# Patient Record
Sex: FEMALE | NOT HISPANIC OR LATINO | Employment: UNEMPLOYED | URBAN - METROPOLITAN AREA
[De-identification: names, ages, dates, MRNs, and addresses within clinical notes are randomized per-mention and may not be internally consistent; named-entity substitution may affect disease eponyms.]

---

## 2023-10-13 ENCOUNTER — TELEPHONE (OUTPATIENT)
Dept: PRIMARY CARE | Facility: CLINIC | Age: 63
End: 2023-10-13
Payer: COMMERCIAL

## 2023-10-13 DIAGNOSIS — Z78.0 POSTMENOPAUSAL STATUS (AGE-RELATED) (NATURAL): Primary | ICD-10-CM

## 2023-10-13 DIAGNOSIS — I10 HYPERTENSION, UNSPECIFIED TYPE: ICD-10-CM

## 2023-10-16 RX ORDER — CHLORTHALIDONE 25 MG/1
25 TABLET ORAL DAILY
COMMUNITY
Start: 2023-05-21 | End: 2023-10-16 | Stop reason: SDUPTHER

## 2023-10-16 RX ORDER — CHLORTHALIDONE 25 MG/1
12.5 TABLET ORAL DAILY
Qty: 45 TABLET | Refills: 0 | Status: SHIPPED | OUTPATIENT
Start: 2023-10-16 | End: 2023-10-17 | Stop reason: SDUPTHER

## 2023-10-16 NOTE — TELEPHONE ENCOUNTER
Pt says her insurance is making her pay for bone dexa test. They say the diagnostic code needs to be changed so this doesn't happen  Call back - [phone]  
RX Refill  Chorthalidone 25 mg   [facility]   
Never smoker

## 2023-10-17 ENCOUNTER — TELEPHONE (OUTPATIENT)
Dept: PRIMARY CARE | Facility: CLINIC | Age: 63
End: 2023-10-17
Payer: COMMERCIAL

## 2023-10-17 DIAGNOSIS — I10 HYPERTENSION, UNSPECIFIED TYPE: ICD-10-CM

## 2023-10-17 RX ORDER — CHLORTHALIDONE 25 MG/1
12.5 TABLET ORAL DAILY
Qty: 45 TABLET | Refills: 0 | Status: SHIPPED | OUTPATIENT
Start: 2023-10-17 | End: 2024-02-19 | Stop reason: SDUPTHER

## 2023-10-17 NOTE — TELEPHONE ENCOUNTER
Rx Refill Request Telephone Encounter    Name:  Cathryn Martinez  :  [id number]  Medication Name:   Chlorthalidone 25 mg            Specific Pharmacy location:  [facility]   Date of last appointment:    Date of next appointment:    Best number to reach patient:

## 2023-10-25 ENCOUNTER — TELEPHONE (OUTPATIENT)
Dept: PRIMARY CARE | Facility: CLINIC | Age: 63
End: 2023-10-25
Payer: COMMERCIAL

## 2023-10-25 NOTE — TELEPHONE ENCOUNTER
Pt is saying bone density test had wrong code. She is wanting an update if this is fixed so she doesn't have to pay 300$  Contact number- [phone]

## 2023-10-26 NOTE — TELEPHONE ENCOUNTER
Talked to pt and let her know the correct code was used in July and this month when it was reordered. Told her to call insurance company and find out specifically why they wont cover it.

## 2024-02-19 ENCOUNTER — TELEPHONE (OUTPATIENT)
Dept: PRIMARY CARE | Facility: CLINIC | Age: 64
End: 2024-02-19

## 2024-02-19 DIAGNOSIS — I10 HYPERTENSION, UNSPECIFIED TYPE: ICD-10-CM

## 2024-02-19 DIAGNOSIS — Z00.00 WELL ADULT EXAM: ICD-10-CM

## 2024-02-19 PROBLEM — N28.9 RENAL INSUFFICIENCY: Status: ACTIVE | Noted: 2020-11-16

## 2024-02-19 PROBLEM — E78.5 HYPERLIPIDEMIA: Status: ACTIVE | Noted: 2020-11-16

## 2024-02-19 PROBLEM — C44.91 BASAL CELL CARCINOMA (BCC): Status: ACTIVE | Noted: 2019-05-06

## 2024-02-19 PROBLEM — E78.2 MIXED HYPERLIPIDEMIA: Status: ACTIVE | Noted: 2019-04-21

## 2024-02-19 NOTE — TELEPHONE ENCOUNTER
Rx Refill Request Telephone Encounter    Name:  Cathryn Martinez  :  [id number]  Medication Name:  Chlorithaidone 25 mg            Specific Pharmacy location:  [facility]   Date of last appointment:    Date of next appointment:    Best number to reach patient:

## 2024-02-20 RX ORDER — CHLORTHALIDONE 25 MG/1
12.5 TABLET ORAL DAILY
Qty: 45 TABLET | Refills: 0 | Status: SHIPPED | OUTPATIENT
Start: 2024-02-20 | End: 2024-06-07 | Stop reason: SDUPTHER

## 2024-05-31 ENCOUNTER — LAB (OUTPATIENT)
Dept: LAB | Facility: LAB | Age: 64
End: 2024-05-31
Payer: COMMERCIAL

## 2024-05-31 DIAGNOSIS — Z00.00 WELL ADULT EXAM: ICD-10-CM

## 2024-05-31 LAB
ALBUMIN SERPL-MCNC: 4.1 G/DL (ref 3.5–5)
ALP BLD-CCNC: 86 U/L (ref 35–125)
ALT SERPL-CCNC: 12 U/L (ref 5–40)
ANION GAP SERPL CALC-SCNC: 10 MMOL/L
APPEARANCE UR: CLEAR
AST SERPL-CCNC: 18 U/L (ref 5–40)
BASOPHILS # BLD AUTO: 0.13 X10*3/UL (ref 0–0.1)
BASOPHILS NFR BLD AUTO: 2 %
BILIRUB SERPL-MCNC: 0.7 MG/DL (ref 0.1–1.2)
BILIRUB UR STRIP.AUTO-MCNC: NEGATIVE MG/DL
BUN SERPL-MCNC: 18 MG/DL (ref 8–25)
CALCIUM SERPL-MCNC: 9.5 MG/DL (ref 8.5–10.4)
CHLORIDE SERPL-SCNC: 105 MMOL/L (ref 97–107)
CHOLEST SERPL-MCNC: 245 MG/DL (ref 133–200)
CHOLEST/HDLC SERPL: 3.1 {RATIO}
CO2 SERPL-SCNC: 28 MMOL/L (ref 24–31)
COLOR UR: NORMAL
CREAT SERPL-MCNC: 1.1 MG/DL (ref 0.4–1.6)
EGFRCR SERPLBLD CKD-EPI 2021: 56 ML/MIN/1.73M*2
EOSINOPHIL # BLD AUTO: 0.13 X10*3/UL (ref 0–0.7)
EOSINOPHIL NFR BLD AUTO: 2 %
ERYTHROCYTE [DISTWIDTH] IN BLOOD BY AUTOMATED COUNT: 12.8 % (ref 11.5–14.5)
GLUCOSE SERPL-MCNC: 85 MG/DL (ref 65–99)
GLUCOSE UR STRIP.AUTO-MCNC: NORMAL MG/DL
HCT VFR BLD AUTO: 44.7 % (ref 36–46)
HDLC SERPL-MCNC: 79 MG/DL
HGB BLD-MCNC: 14.5 G/DL (ref 12–16)
IMM GRANULOCYTES # BLD AUTO: 0.02 X10*3/UL (ref 0–0.7)
IMM GRANULOCYTES NFR BLD AUTO: 0.3 % (ref 0–0.9)
KETONES UR STRIP.AUTO-MCNC: NEGATIVE MG/DL
LDLC SERPL CALC-MCNC: 138 MG/DL (ref 65–130)
LEUKOCYTE ESTERASE UR QL STRIP.AUTO: NEGATIVE
LYMPHOCYTES # BLD AUTO: 2.99 X10*3/UL (ref 1.2–4.8)
LYMPHOCYTES NFR BLD AUTO: 46.4 %
MCH RBC QN AUTO: 28.7 PG (ref 26–34)
MCHC RBC AUTO-ENTMCNC: 32.4 G/DL (ref 32–36)
MCV RBC AUTO: 89 FL (ref 80–100)
MONOCYTES # BLD AUTO: 0.59 X10*3/UL (ref 0.1–1)
MONOCYTES NFR BLD AUTO: 9.2 %
NEUTROPHILS # BLD AUTO: 2.58 X10*3/UL (ref 1.2–7.7)
NEUTROPHILS NFR BLD AUTO: 40.1 %
NITRITE UR QL STRIP.AUTO: NEGATIVE
NRBC BLD-RTO: 0 /100 WBCS (ref 0–0)
PH UR STRIP.AUTO: 5.5 [PH]
PLATELET # BLD AUTO: 276 X10*3/UL (ref 150–450)
POTASSIUM SERPL-SCNC: 4 MMOL/L (ref 3.4–5.1)
PROT SERPL-MCNC: 6.3 G/DL (ref 5.9–7.9)
PROT UR STRIP.AUTO-MCNC: NEGATIVE MG/DL
RBC # BLD AUTO: 5.05 X10*6/UL (ref 4–5.2)
RBC # UR STRIP.AUTO: NEGATIVE /UL
SODIUM SERPL-SCNC: 143 MMOL/L (ref 133–145)
SP GR UR STRIP.AUTO: 1.01
TRIGL SERPL-MCNC: 142 MG/DL (ref 40–150)
UROBILINOGEN UR STRIP.AUTO-MCNC: NORMAL MG/DL
WBC # BLD AUTO: 6.4 X10*3/UL (ref 4.4–11.3)

## 2024-05-31 PROCEDURE — 81003 URINALYSIS AUTO W/O SCOPE: CPT

## 2024-05-31 PROCEDURE — 80061 LIPID PANEL: CPT

## 2024-05-31 PROCEDURE — 80053 COMPREHEN METABOLIC PANEL: CPT

## 2024-05-31 PROCEDURE — 36415 COLL VENOUS BLD VENIPUNCTURE: CPT

## 2024-05-31 PROCEDURE — 85025 COMPLETE CBC W/AUTO DIFF WBC: CPT

## 2024-06-04 NOTE — PROGRESS NOTES
Subjective   Patient ID: Cathryn Martinez is a 64 y.o. female who presents for Annual Exam.    HPI   # 1. Aniya is here for annual physical exam. Her past medical history, family history and social history are reviewed and updated. She lives with her .   She is not yet on Medicare. Adri Simmons is her sister.  She feels well.   Their mother passed away in March at age 90.  She had a normal colonoscopy in the fall of 2020. Five year follow-up was recommended due to a positive family history.   She had a normal DEXA scan in 2023.  She has had her shingles vaccine.   Pre draw is reviewed.   Consultants: [facility] for routine skin checks. She has a history of basal cell skin cancer.  GYN: Her last Pap smear was in 2019. Her Pap smear was normal and HPV was negative. Her last period was in 2007 and she has had no abnormal bleeding bleeding since that time.    # 2. Aniya is here for follow-up of hypertension. Currently on chlorthalidone 12.5 milligrams. She cuts the 25 milligram tablet in half.   # 3.   She has elevated cholesterol.  She is on no medications.  Her cholesterol is elevated from last year.  She does have a high HDL.   She exercises regularly.    Review of Systems  Constitutional Symptoms: negative for fever, loss of appetite, headaches, fatigue or dizziness. Denies recent illness  Eyes: negative for loss and blurring of vision, no double vision.   Ear, Nose, Mouth, Throat: negative for hearing loss, tinnitus  Cardiovascular: negative for chest pain, palpitations, edema, claudication.   Respiratory: negative for shortness of breath, dyspnea on exertion, pain with breathing, coughing.   Breast: negative for tenderness, or masses   Gastrointestinal: negative for indigestion, abdominal pain, change in bowel habits, blood in stool.   Musculoskeletal: negative for joint pain, joint swelling, myalgias, cramps.   She is getting some stiffness of her knees when she is down on the  "floor.  Integumentary: negative for change in mole, skin trouble or rash.   Neurological: negative for numbness, tingling, weakness, tremors.   Psychiatric: negative for depression, anxiety.   Allergic: negative for seasonal symptoms, eczema    Objective   /66   Pulse (!) 49   Ht 1.695 m (5' 6.75\")   Wt 68.5 kg (151 lb)   SpO2 98%   BMI 23.83 kg/m²     Physical Exam  General Appearance: Comfortable. She is well nourished, and well developed.  Eyes: PERRLA, EOMI, conjunctiva and sclerae clear. Extraocular muscle exam reveals EOMI.  Ears, Nose, Mouth, Throat: Bilateral canals are normal. Both tympanic membranes are pearly gray and landmarks normal .   Nose is not congested. Throat is noninjected without exudate.  Neck: Neck reveals supple, no adenopathy, no thyromegaly, or carotid bruits.  Breasts: no skin dimpling. No tenderness or masses. No axillary adenopathy.  Chest: Lungs are clear to auscultation bilaterally with no wheezes, rales, or rhonchi.  Cardiovascular: RRR without MRG.  Abdomen: Abdomen is soft, NT, ND with no masses.  Lymph Nodes: Bilateral axillary lymph nodes are unremarkable. Bilateral inguinal lymph nodes are unremarkable.  Musculoskeletal: 5/5 and equal strength in bilateral upper and lower extremities.  Skin: Skin reveals good turgor and no rashes .  Neurological: Neuro: Intact and non-focal. Cranial nerves II - XII are grossly intact.  Psychiatric: Patient's mood is appropriate.    Assessment/Plan   Diagnoses and all orders for this visit:  Well adult exam  Primary hypertension  -     ECG 12 lead (Clinic Performed)  -     chlorthalidone (Hygroton) 25 mg tablet; Take 0.5 tablets (12.5 mg) by mouth once daily.  Pure hypercholesterolemia  Visit for screening mammogram  -     BI mammo bilateral screening tomosynthesis; Future  Need for vaccination  -     Pneumococcal conjugate vaccine, 20-valent (PREVNAR 20)  Other orders  -     Follow Up In Primary Care - Established; Future  -     " Follow Up In Primary Care - Health Maintenance; Future

## 2024-06-07 ENCOUNTER — OFFICE VISIT (OUTPATIENT)
Dept: PRIMARY CARE | Facility: CLINIC | Age: 64
End: 2024-06-07
Payer: COMMERCIAL

## 2024-06-07 ENCOUNTER — TELEPHONE (OUTPATIENT)
Dept: PRIMARY CARE | Facility: CLINIC | Age: 64
End: 2024-06-07

## 2024-06-07 VITALS
OXYGEN SATURATION: 98 % | DIASTOLIC BLOOD PRESSURE: 66 MMHG | BODY MASS INDEX: 23.7 KG/M2 | HEIGHT: 67 IN | SYSTOLIC BLOOD PRESSURE: 108 MMHG | WEIGHT: 151 LBS | HEART RATE: 49 BPM

## 2024-06-07 DIAGNOSIS — Z00.00 WELL ADULT EXAM: Primary | ICD-10-CM

## 2024-06-07 DIAGNOSIS — Z12.31 VISIT FOR SCREENING MAMMOGRAM: ICD-10-CM

## 2024-06-07 DIAGNOSIS — E78.00 PURE HYPERCHOLESTEROLEMIA: Primary | ICD-10-CM

## 2024-06-07 DIAGNOSIS — Z23 NEED FOR VACCINATION: ICD-10-CM

## 2024-06-07 DIAGNOSIS — I10 PRIMARY HYPERTENSION: ICD-10-CM

## 2024-06-07 DIAGNOSIS — E78.00 PURE HYPERCHOLESTEROLEMIA: ICD-10-CM

## 2024-06-07 PROCEDURE — 1158F ADVNC CARE PLAN TLK DOCD: CPT | Performed by: FAMILY MEDICINE

## 2024-06-07 PROCEDURE — 1123F ACP DISCUSS/DSCN MKR DOCD: CPT | Performed by: FAMILY MEDICINE

## 2024-06-07 PROCEDURE — 90677 PCV20 VACCINE IM: CPT | Performed by: FAMILY MEDICINE

## 2024-06-07 PROCEDURE — 3078F DIAST BP <80 MM HG: CPT | Performed by: FAMILY MEDICINE

## 2024-06-07 PROCEDURE — 1036F TOBACCO NON-USER: CPT | Performed by: FAMILY MEDICINE

## 2024-06-07 PROCEDURE — 1159F MED LIST DOCD IN RCRD: CPT | Performed by: FAMILY MEDICINE

## 2024-06-07 PROCEDURE — 1126F AMNT PAIN NOTED NONE PRSNT: CPT | Performed by: FAMILY MEDICINE

## 2024-06-07 PROCEDURE — 99212 OFFICE O/P EST SF 10 MIN: CPT | Performed by: FAMILY MEDICINE

## 2024-06-07 PROCEDURE — 3074F SYST BP LT 130 MM HG: CPT | Performed by: FAMILY MEDICINE

## 2024-06-07 PROCEDURE — 90471 IMMUNIZATION ADMIN: CPT | Performed by: FAMILY MEDICINE

## 2024-06-07 PROCEDURE — 99397 PER PM REEVAL EST PAT 65+ YR: CPT | Performed by: FAMILY MEDICINE

## 2024-06-07 PROCEDURE — 93000 ELECTROCARDIOGRAM COMPLETE: CPT | Performed by: FAMILY MEDICINE

## 2024-06-07 RX ORDER — CHLORTHALIDONE 25 MG/1
12.5 TABLET ORAL DAILY
Qty: 45 TABLET | Refills: 3 | Status: SHIPPED | OUTPATIENT
Start: 2024-06-07 | End: 2025-06-07

## 2024-06-07 ASSESSMENT — PATIENT HEALTH QUESTIONNAIRE - PHQ9
1. LITTLE INTEREST OR PLEASURE IN DOING THINGS: NOT AT ALL
2. FEELING DOWN, DEPRESSED OR HOPELESS: NOT AT ALL
SUM OF ALL RESPONSES TO PHQ9 QUESTIONS 1 AND 2: 0

## 2024-06-07 ASSESSMENT — PAIN SCALES - GENERAL: PAINLEVEL: 0-NO PAIN

## 2024-07-08 ENCOUNTER — HOSPITAL ENCOUNTER (OUTPATIENT)
Dept: RADIOLOGY | Facility: CLINIC | Age: 64
Discharge: HOME | End: 2024-07-08
Payer: COMMERCIAL

## 2024-07-08 VITALS — HEIGHT: 66 IN | BODY MASS INDEX: 24.27 KG/M2 | WEIGHT: 151 LBS

## 2024-07-08 DIAGNOSIS — Z12.31 VISIT FOR SCREENING MAMMOGRAM: ICD-10-CM

## 2024-07-08 PROCEDURE — 77063 BREAST TOMOSYNTHESIS BI: CPT | Performed by: RADIOLOGY

## 2024-07-08 PROCEDURE — 77067 SCR MAMMO BI INCL CAD: CPT | Performed by: RADIOLOGY

## 2024-07-08 PROCEDURE — 77067 SCR MAMMO BI INCL CAD: CPT

## 2024-07-24 NOTE — PROGRESS NOTES
Subjective   Patient ID: Cathryn Martinez is a 64 y.o. female who presents for Knee Pain (Right knee soreness and stiffness x 6/18. She was standing on an ottoman which started to tip so she jumped off and thinks she tweaked her knee in the process although it didn't start hurting until a couple days later).    HPI    Cathryn presents with new onset pain in her right knee.    She was standing on an ottoman to reach something and the ottoman tipped and she fell.  She landed on her feet.  She did not have immediate discomfort but a couple days later noted discomfort in the lateral part of her right knee.  She has a knee brace that she has been wearing.  She continue doing her normal activities including a lot of exercise classes and Boot Camp's.  She had to continued discomfort especially later in the day.  She has now just been doing water exercises and notes continued symptoms.  Her knee never swelled or bruised.  She took some NSAIDs for a few days.    Review of Systems    Her knee feels more stiff than painful.  It is worse at the end of the day.  She cannot sit with her knees flexed as that is uncomfortable.    Objective   /68 (BP Location: Left arm)   Pulse 81   Wt 68.9 kg (152 lb)   SpO2 96%   BMI 24.35 kg/m²     Physical Exam    Her gait is normal.    There is questionable swelling of the right knee.  Nothing that is terribly significant.    She has good range of motion without limitation.  She has discomfort with complete flexion.    Anterior posterior drawer signs are negative.  No discomfort with varus and valgus maneuvers.  She has no point tenderness and no joint line tenderness.    Assessment/Plan   Diagnoses and all orders for this visit:  Acute pain of right knee  -     Referral to Physical Therapy; Future    Recommend Tylenol as needed.  She should continue to wear her brace.  She may have a small meniscal tear.  At this point no imaging is indicated.  She will go to physical therapy and  follow-up for persistent symptoms.

## 2024-07-26 ENCOUNTER — OFFICE VISIT (OUTPATIENT)
Dept: PRIMARY CARE | Facility: CLINIC | Age: 64
End: 2024-07-26
Payer: COMMERCIAL

## 2024-07-26 VITALS
BODY MASS INDEX: 24.35 KG/M2 | SYSTOLIC BLOOD PRESSURE: 104 MMHG | WEIGHT: 152 LBS | HEART RATE: 81 BPM | DIASTOLIC BLOOD PRESSURE: 68 MMHG | OXYGEN SATURATION: 96 %

## 2024-07-26 DIAGNOSIS — M25.561 ACUTE PAIN OF RIGHT KNEE: Primary | ICD-10-CM

## 2024-07-26 PROCEDURE — 3074F SYST BP LT 130 MM HG: CPT | Performed by: FAMILY MEDICINE

## 2024-07-26 PROCEDURE — 1160F RVW MEDS BY RX/DR IN RCRD: CPT | Performed by: FAMILY MEDICINE

## 2024-07-26 PROCEDURE — 3078F DIAST BP <80 MM HG: CPT | Performed by: FAMILY MEDICINE

## 2024-07-26 PROCEDURE — 1126F AMNT PAIN NOTED NONE PRSNT: CPT | Performed by: FAMILY MEDICINE

## 2024-07-26 PROCEDURE — 1036F TOBACCO NON-USER: CPT | Performed by: FAMILY MEDICINE

## 2024-07-26 PROCEDURE — 1159F MED LIST DOCD IN RCRD: CPT | Performed by: FAMILY MEDICINE

## 2024-07-26 PROCEDURE — 1123F ACP DISCUSS/DSCN MKR DOCD: CPT | Performed by: FAMILY MEDICINE

## 2024-07-26 PROCEDURE — 99213 OFFICE O/P EST LOW 20 MIN: CPT | Performed by: FAMILY MEDICINE

## 2024-07-26 ASSESSMENT — PATIENT HEALTH QUESTIONNAIRE - PHQ9
2. FEELING DOWN, DEPRESSED OR HOPELESS: NOT AT ALL
1. LITTLE INTEREST OR PLEASURE IN DOING THINGS: NOT AT ALL
SUM OF ALL RESPONSES TO PHQ9 QUESTIONS 1 AND 2: 0

## 2024-07-26 ASSESSMENT — PAIN SCALES - GENERAL: PAINLEVEL: 0-NO PAIN

## 2024-07-31 ENCOUNTER — EVALUATION (OUTPATIENT)
Dept: PHYSICAL THERAPY | Facility: CLINIC | Age: 64
End: 2024-07-31
Payer: COMMERCIAL

## 2024-07-31 DIAGNOSIS — M25.561 ACUTE PAIN OF RIGHT KNEE: Primary | ICD-10-CM

## 2024-07-31 DIAGNOSIS — R26.2 DIFFICULTY IN WALKING: ICD-10-CM

## 2024-07-31 PROCEDURE — 97110 THERAPEUTIC EXERCISES: CPT | Mod: GP | Performed by: PHYSICAL THERAPIST

## 2024-07-31 PROCEDURE — 97161 PT EVAL LOW COMPLEX 20 MIN: CPT | Mod: GP | Performed by: PHYSICAL THERAPIST

## 2024-07-31 ASSESSMENT — ENCOUNTER SYMPTOMS
LOSS OF SENSATION IN FEET: 0
DEPRESSION: 0
OCCASIONAL FEELINGS OF UNSTEADINESS: 0

## 2024-07-31 ASSESSMENT — PAIN SCALES - GENERAL: PAINLEVEL_OUTOF10: 2

## 2024-07-31 ASSESSMENT — PAIN - FUNCTIONAL ASSESSMENT: PAIN_FUNCTIONAL_ASSESSMENT: 0-10

## 2024-07-31 ASSESSMENT — PAIN DESCRIPTION - DESCRIPTORS: DESCRIPTORS: ACHING;TIGHTNESS

## 2024-07-31 NOTE — PROGRESS NOTES
Physical Therapy  Physical Therapy Orthopedic Evaluation    Patient Name: Cathryn Martinez  MRN: [id number]  Today's Date: 7/31/2024  Time Calculation  Start Time: 0915  Stop Time: 1000  Time Calculation (min): 45 min  PT Evaluation Time Entry  PT Evaluation (Low) Time Entry: 28, PT Therapeutic Procedures Time Entry  Therapeutic Exercise Time Entry: 8,      Insurance:  Payor: ANTH / Plan: ANTHEM HMP / Product Type: *No Product type* /   Number of Treatments Authorized: 1/20  Certification Period Start Date: 07/31/24  Certification Period End Date: 10/29/24    Current Problem  1. Acute pain of right knee  Referral to Physical Therapy    Follow Up In Physical Therapy      2. Difficulty in walking  Follow Up In Physical Therapy          Precautions:   Precautions  STEADI Fall Risk Score (The score of 4 or more indicates an increased risk of falling): 1    Medical History Form: Reviewed (scanned into chart)    Subjective:   Subjective : Having some difficulty returning to previous level of activity including some light running and kneeling on the floor.     General:  General  Reason for Referral: R knee pain  Referred By: Rebeca Blunt Md  Past Medical History Relevant to Rehab: HTN  General Comment: Was standing on an ottoman when it started to tip. Patient jumped off and twisted her knee on the landing. Reporting pain as mild at this time.    Pain:  Pain Assessment: 0-10  0-10 (Numeric) Pain Score: 2  Pain Type: Acute pain  Pain Location: Knee  Pain Orientation: Right  Pain Descriptors: Aching, Tightness  Pain Frequency: Intermittent  Pain Onset: Sudden  Clinical Progression: Not changed  Patient's Stated Pain Goal: No pain    Relevant Information (PMH & Previous Tests/Imaging): none  Previous Interventions/Treatments: None    Prior Level of Function (PLOF)  Prior Function Per Pt/Caregiver Report  Level of [place]: Independent with ADLs and functional transfers, Independent with homemaking with  ambulation  Patient previously independent with all ADLs    Patients Living Environment:   Home Living Comment: Lives in multi-level home with stairs.    Primary Language: English    Red Flags: Do you have any of the following? No  Fever/chills, unexplained weight changes, dizziness/fainting, unexplained change in bowel or bladder functions, unexplained malaise or muscle weakness, night pain/sweats, numbness or tingling    Objective   Denies hip pain.   HIP  Hip AROM  R hip flexion: (125°): 125  L hip flexion: (125°): 125  R hip extension: (10°): 10  L hip extension: (10°): 10  R hip ER: (45°): 35  L hip ER: (45°): 46  R hip IR: (45°): 22  L hip IR: (45°): 20    Knee Observation  Observation Comment: R knee with min swelling at patellar tendon    Knee Palpation/Joint Mobility Assessment  Palpation/Joint Mobility Comment: Denies tenderness at medial or lateral joint line.  Denies tenderness at quads / ITB  Knee AROM  R knee flexion: (140°): 135  L knee flexion: (140°): 148  R knee extension: (0°): -3  L knee extension: (0°): 0    Knee MMT  R knee flexion: (5/5): 5/5  L knee flexion: (5/5): 5/5  R knee extension: (5/5): 5/5  L knee extension: (5/5): 5/5  Special Tests  Varus at 0°: (Negative): (-)  Varus at 30°: (Negative): (-)  Valgus at 0°: (Negative): (-)  Valgus at 30°: (Negative): (-)  Kieran’s: (Negative): (-)  Other: Unable to perform full sit squat. No sharp pain, tightness.    Gait  Gait Comment: Normal gait pattern.    Outcome Measures:  Other Measures  Lower Extremity Funtional Score (LEFS): 61/80 = 76%    EDUCATION: home exercise program, plan of care, activity modifications, pain management, and injury pathology  Outpatient Education  Individual(s) Educated: Patient  Education Provided: Anatomy, Home Exercise Program  Risk and Benefits Discussed with Patient/Caregiver/Other: yes  Patient/Caregiver Demonstrated Understanding: yes  Plan of Care Discussed and Agreed Upon: yes  Patient Response to  Education: Patient/Caregiver Verbalized Understanding of Information  Education Comment: Access Code: [email or web address]  URL: [email or web address]  Date: 07/31/2024  Prepared by: Miki Burgos    Exercises  - Seated Quad Set  - 4-5 x daily - 5-6 x weekly - 1 sets - 5-10 reps - 5 hold  - Gastroc Stretch on Wall  - 2 x daily - 5-6 x weekly - 1 sets - 3-5 reps - 15-20 hold    Assessment:  PT Assessment Results: Decreased range of motion, Pain  Rehab Prognosis: Good  Assessment Comment: Patient arrives with R knee pain complaints; especially with termainal knee flexion. Patient is ~ 4 weeks from injury and continues to have difficulty with stiffness and with her usual fitness activities. Will benefit from therapy to address swelling, pain and ROM loss to restore PLOF.    Clinical Presentation: Stable and/or uncomplicated characteristics  Personal Factors: None    Plan:  Treatment/Interventions: Dry needling, Education/ Instruction, Manual therapy, Neuromuscular re-education, Self care/ home management, Taping techniques, Therapeutic activities, Therapeutic exercises, Vasopneumatic device  PT Plan: Skilled PT  PT Frequency: 2 times per week  Duration: 5 weeks  Onset Date: 06/18/24  Certification Period Start Date: 07/31/24  Certification Period End Date: 10/29/24  Number of Treatments Authorized: 1/20  Rehab Potential: Good  Plan of Care Agreement: Patient    Goals: Set and discussed today  Active       PT Problem       PT Goal 1       Start:  07/31/24    Expected End:  10/29/24       STG  1) Patient will improve LEFS score by 6 points in order to perform functional activities at home and in the community in 3 weeks.  2) Patient will be able to complete ADLs with pain in knee less than 2/10 in 3 weeks.  3) Pt will improve knee Extension ROM to 0 degrees to be able to complete ADLs with less difficulty in 3 weeks.   4) Patient will be independent with HEP to allow for continued improvement in daily tasks at  home and in the community in 3 visits.   LTG  1) Patient will be able to perform proper squatting technique in order to reduce compression on knee and prevent increased pain with daily tasks in 5-6 weeks.  2) Patient will report performing stairs in reciprocal pattern without tightness or pain in 5-6 weeks.   3) Patient will improve LEFS score >/=70/80 points in order to perform functional activities at home and in the community by discharge.          Patient Stated Goal 1       Start:  07/31/24    Expected End:  10/29/24       1) States she would like to kneel and perform sit squat on floor without pain or tightness for interacting with grandchildren in 5-6 weeks.              Plan of care was developed with input and agreement by the patient    Treatments:  Therapeutic Exercise  Therapeutic Exercise Activity 1: QS with over pressure: 5 hold x 5  Therapeutic Exercise Activity 2: Gastroc stretch: 15 hold x 3      Miki Burgos, PT

## 2024-08-07 ENCOUNTER — TREATMENT (OUTPATIENT)
Dept: PHYSICAL THERAPY | Facility: CLINIC | Age: 64
End: 2024-08-07
Payer: COMMERCIAL

## 2024-08-07 DIAGNOSIS — M25.561 ACUTE PAIN OF RIGHT KNEE: ICD-10-CM

## 2024-08-07 DIAGNOSIS — R26.2 DIFFICULTY IN WALKING: ICD-10-CM

## 2024-08-07 PROCEDURE — 97110 THERAPEUTIC EXERCISES: CPT | Mod: GP | Performed by: PHYSICAL THERAPIST

## 2024-08-07 ASSESSMENT — PAIN SCALES - GENERAL: PAINLEVEL_OUTOF10: 2

## 2024-08-07 ASSESSMENT — PAIN - FUNCTIONAL ASSESSMENT: PAIN_FUNCTIONAL_ASSESSMENT: 0-10

## 2024-08-07 ASSESSMENT — PAIN DESCRIPTION - DESCRIPTORS: DESCRIPTORS: ACHING;TIGHTNESS

## 2024-08-07 NOTE — PROGRESS NOTES
"  Physical Therapy Treatment    Patient Name: Cathryn Martinez  MRN: [id number]  Today's Date: 8/7/2024  Time Calculation  Start Time: 1500  Stop Time: 1535  Time Calculation (min): 35 min  PT Therapeutic Procedures Time Entry  Manual Therapy Time Entry: 7  Therapeutic Exercise Time Entry: 25,      Current Problem  1. Acute pain of right knee  Follow Up In Physical Therapy      2. Difficulty in walking  Follow Up In Physical Therapy            Insurance:  Payor: DARIEN / Plan: ANTHEM HMP / Product Type: *No Product type* /   Number of Treatments Authorized: 2/20  Certification Period Start Date: 07/31/24  Certification Period End Date: 10/29/24    Subjective : Walked a few miles - no specific stiffness - \"it just doesn't feel the same.\"   General  Reason for Referral: R knee pain  Referred By: Rebeca Blunt Md  Past Medical History Relevant to Rehab: HTN  General Comment: Was standing on an ottoman when it started to tip. Patient jumped off and twisted her knee on the landing. Reporting pain as mild at this time.    Performing HEP?: Yes    Precautions   None  Pain  Pain Assessment: 0-10  0-10 (Numeric) Pain Score: 2  Pain Type: Acute pain  Pain Location: Knee  Pain Orientation: Right  Pain Descriptors: Aching, Tightness  Pain Frequency: Intermittent  Pain Onset: Sudden    Objective   Treatments:  Therapeutic Exercise  Therapeutic Exercise Activity 1: SciFit: L 2 - 4 minutes  Therapeutic Exercise Activity 2: Gastroc stretch: 1 minute - slant board  Therapeutic Exercise Activity 3: Heel raise on slant board: x 20  Therapeutic Exercise Activity 4: Dynamics: Marches, Butt kicks, Tin [place], Side steps  Therapeutic Exercise Activity 5: Quad set: 10 hold x 5  Therapeutic Exercise Activity 6: SLR:2 # x 15  Therapeutic Exercise Activity 7: SL hip ABD: 2# x 15  Therapeutic Exercise Activity 8: SL hip ADD: 2# x 15  Therapeutic Exercise Activity 9: SL hip EXT: 2# x 15     Manual Therapy  Manual Therapy Activity 1: A/P knee " mobs - grade II  Manual Therapy Activity 2: Knee rotational mobs  Manual Therapy Activity 3: DTM of quad tendon and distal ITB -  STM/MFR of R quad using roller.    OP EDUCATION:  Outpatient Education  Education Comment: Access Code: [email or web address]  URL: [email or web address]"/  Date: 08/07/2024  Prepared by: Miki Burgos    Exercises  - Sidelying Hip Abduction  - 1 x daily - 6-7 x weekly - 2-3 sets - 10 reps  - Active Straight Leg Raise with Quad Set  - 1 x daily - 6-7 x weekly - 2-3 sets - 10 reps  - Prone Hip Extension  - 1 x daily - 6-7 x weekly - 2-3 sets - 10 reps  - Sidelying Hip Adduction  - 1 x daily - 6-7 x weekly - 2-3 sets - 10 reps    Assessment:  PT Assessment  Assessment Comment: Patient arrives with R knee pain complaints; especially with termainal knee flexion. Tolerated session well. Some benefit from knee mobs/manual therapy.  Issued add'l exercises for HEP. R knee with possible Baker's cyst that may be causing symptoms. Instructed to avoid terminal knee flexion so as to not aggravate popliteal structures.Will benefit from therapy to address swelling, pain and ROM loss to restore PLOF.    Plan:  OP PT Plan  Treatment/Interventions: Dry needling, Education/ Instruction, Manual therapy, Neuromuscular re-education, Self care/ home management, Taping techniques, Therapeutic activities, Therapeutic exercises, Vasopneumatic device  PT Plan: Skilled PT  PT Frequency: 2 times per week  Duration: 5 weeks  Onset Date: 06/18/24  Certification Period Start Date: 07/31/24  Certification Period End Date: 10/29/24  Number of Treatments Authorized: 2/20  Rehab Potential: Good  Plan of Care Agreement: Patient  Progress program as tolerated.   Goals:  Active       PT Problem       PT Goal 1       Start:  07/31/24    Expected End:  10/29/24       STG  1) Patient will improve LEFS score by 6 points in order to perform functional activities at home and in the community in 3 weeks.  2) Patient will be  able to complete ADLs with pain in knee less than 2/10 in 3 weeks.  3) Pt will improve knee Extension ROM to 0 degrees to be able to complete ADLs with less difficulty in 3 weeks.   4) Patient will be independent with HEP to allow for continued improvement in daily tasks at home and in the community in 3 visits.   LTG  1) Patient will be able to perform proper squatting technique in order to reduce compression on knee and prevent increased pain with daily tasks in 5-6 weeks.  2) Patient will report performing stairs in reciprocal pattern without tightness or pain in 5-6 weeks.   3) Patient will improve LEFS score >/=70/80 points in order to perform functional activities at home and in the community by discharge.          Patient Stated Goal 1       Start:  07/31/24    Expected End:  10/29/24       1) States she would like to kneel and perform sit squat on floor without pain or tightness for interacting with grandchildren in 5-6 weeks.               Miki Burgos, PT

## 2024-08-14 ENCOUNTER — TREATMENT (OUTPATIENT)
Dept: PHYSICAL THERAPY | Facility: CLINIC | Age: 64
End: 2024-08-14
Payer: COMMERCIAL

## 2024-08-14 DIAGNOSIS — M25.561 ACUTE PAIN OF RIGHT KNEE: ICD-10-CM

## 2024-08-14 DIAGNOSIS — R26.2 DIFFICULTY IN WALKING: ICD-10-CM

## 2024-08-14 PROCEDURE — 97110 THERAPEUTIC EXERCISES: CPT | Mod: GP | Performed by: PHYSICAL THERAPIST

## 2024-08-14 PROCEDURE — 97530 THERAPEUTIC ACTIVITIES: CPT | Mod: GP | Performed by: PHYSICAL THERAPIST

## 2024-08-14 ASSESSMENT — PAIN - FUNCTIONAL ASSESSMENT: PAIN_FUNCTIONAL_ASSESSMENT: 0-10

## 2024-08-14 ASSESSMENT — PAIN SCALES - GENERAL: PAINLEVEL_OUTOF10: 2

## 2024-08-14 NOTE — PROGRESS NOTES
"  Physical Therapy Treatment    Patient Name: Cathryn Martinez  MRN: [id number]  Today's Date: 8/14/2024  Time Calculation  Start Time: 0745  Stop Time: 0830  Time Calculation (min): 45 min  PT Therapeutic Procedures Time Entry  Manual Therapy Time Entry: 5  Therapeutic Exercise Time Entry: 23  Therapeutic Activity Time Entry: 12,      Current Problem  1. Acute pain of right knee  Follow Up In Physical Therapy      2. Difficulty in walking  Follow Up In Physical Therapy            Insurance:  Payor: DARIEN / Plan: DARIEN HMP / Product Type: *No Product type* /   Number of Treatments Authorized: 3/20  Certification Period Start Date: 07/31/24  Certification Period End Date: 10/29/24    Subjective : No significant change. No add'l soreness following session. Sometimes feels a click with sit to stand at anterior knee.   General  Reason for Referral: R knee pain  Referred By: Rebeca Blunt Md  Past Medical History Relevant to Rehab: HTN  General Comment: Was standing on an ottoman when it started to tip. Patient jumped off and twisted her knee on the landing. Reporting pain as mild at this time.    Performing HEP?: Yes    Pain  Pain Assessment: 0-10  0-10 (Numeric) Pain Score: 2  Pain Type: Acute pain  Pain Location: Knee  Pain Orientation: Right  Pain Frequency: Intermittent    Objective   Treatments:    Therapeutic Exercise  Therapeutic Exercise Activity 1: Upright bike: seat 6 - Level 1 x 5 min  Therapeutic Exercise Activity 2: Gastroc stretch: 1 minute - slant board  Therapeutic Exercise Activity 3: Heel raise on slant board: x 20  Therapeutic Exercise Activity 4: Dynamics: Marches, Butt kicks, Tin [place], Side steps/Yband 20 ft x 2  Therapeutic Exercise Activity 5: Total Gym: Lvl 7 - 1 x min  Therapeutic Exercise Activity 6: Total Gym: single leg R 2 x 5 - L x 10  Therapeutic Exercise Activity 7: Leg ext: SL: 40#'s L x 10 - R 2 x 5  Therapeutic Exercise Activity 8: Single leg sit to stand: 19\" surface: 2 x 5 - " "B     Manual Therapy  Manual Therapy Activity 1: K-tape at patellar tendon x 2 as \"cho pat\" strap    Therapeutic Activity  Therapeutic Activity 1: Squats: at // bars  2 x 10  Therapeutic Activity 2: 6\" FWD step ups: 2 x 10 - ea  Therapeutic Activity 3: Lunge walk: 20 ft x 2    OP EDUCATION:  Outpatient Education  Education Comment: Access Code: [email or web address]  URL: [email or web address]  Date: 08/14/2024  Prepared by: Miki Burgos    Exercises  - Single Leg Sit to Stand with Arms Crossed  - 1-2 x daily - 6-7 x weekly - 2 sets - 5-8 reps  Edu re: tape care.   Assessment:  PT Assessment  Assessment Comment: Patient reporting up to 50% improvement of symptoms with Ktape at R patellar tendon during exercises. Significantly challenged with single leg work L vs R on Total Gym and leg extension machine. Issued single leg sit to stand for HEP. Will benefit from add'l therapy to address symptoms and deficits.    Plan:  OP PT Plan  Treatment/Interventions: Dry needling, Education/ Instruction, Manual therapy, Neuromuscular re-education, Self care/ home management, Taping techniques, Therapeutic activities, Therapeutic exercises, Vasopneumatic device  PT Plan: Skilled PT  PT Frequency: 2 times per week  Duration: 5 weeks  Onset Date: 06/18/24  Certification Period Start Date: 07/31/24  Certification Period End Date: 10/29/24  Number of Treatments Authorized: 3/20  Rehab Potential: Good  Plan of Care Agreement: Patient  Continue with single leg exercises.   Goals:  Active       PT Problem       PT Goal 1       Start:  07/31/24    Expected End:  10/29/24       STG  1) Patient will improve LEFS score by 6 points in order to perform functional activities at home and in the community in 3 weeks.  2) Patient will be able to complete ADLs with pain in knee less than 2/10 in 3 weeks.  3) Pt will improve knee Extension ROM to 0 degrees to be able to complete ADLs with less difficulty in 3 weeks.   4) Patient will be " independent with HEP to allow for continued improvement in daily tasks at home and in the community in 3 visits.   LTG  1) Patient will be able to perform proper squatting technique in order to reduce compression on knee and prevent increased pain with daily tasks in 5-6 weeks.  2) Patient will report performing stairs in reciprocal pattern without tightness or pain in 5-6 weeks.   3) Patient will improve LEFS score >/=70/80 points in order to perform functional activities at home and in the community by discharge.          Patient Stated Goal 1       Start:  07/31/24    Expected End:  10/29/24       1) States she would like to kneel and perform sit squat on floor without pain or tightness for interacting with grandchildren in 5-6 weeks.               Miki Burgos, PT

## 2024-08-21 ENCOUNTER — TREATMENT (OUTPATIENT)
Dept: PHYSICAL THERAPY | Facility: CLINIC | Age: 64
End: 2024-08-21
Payer: COMMERCIAL

## 2024-08-21 DIAGNOSIS — M25.561 ACUTE PAIN OF RIGHT KNEE: ICD-10-CM

## 2024-08-21 DIAGNOSIS — R26.2 DIFFICULTY IN WALKING: ICD-10-CM

## 2024-08-21 PROCEDURE — 97140 MANUAL THERAPY 1/> REGIONS: CPT | Mod: GP | Performed by: PHYSICAL THERAPIST

## 2024-08-21 PROCEDURE — 97110 THERAPEUTIC EXERCISES: CPT | Mod: GP | Performed by: PHYSICAL THERAPIST

## 2024-08-21 ASSESSMENT — PAIN - FUNCTIONAL ASSESSMENT: PAIN_FUNCTIONAL_ASSESSMENT: 0-10

## 2024-08-21 ASSESSMENT — PAIN SCALES - GENERAL: PAINLEVEL_OUTOF10: 2

## 2024-08-21 NOTE — PROGRESS NOTES
"Physical Therapy Treatment    Patient Name: Cathryn Martinez  MRN: [id number]  Today's Date: 8/21/2024    Current Problem  Problem List Items Addressed This Visit             ICD-10-CM    Acute pain of right knee M25.561     Other Visit Diagnoses         Codes    Difficulty in walking     R26.2            Insurance:  Payor: ANTH / Plan: ANTHEM HMP / Product Type: *No Product type* /   Number of Treatments Authorized: eval +3/5  Certification Period Start Date: 07/31/24  Certification Period End Date: 10/29/24    Subjective   General  Reason for Referral: R knee pain  Referred By: Rebeca Blunt Md  Past Medical History Relevant to Rehab: HTN  General Comment: Believes tape helped \"a little bit.\" Stayed on for a few days.    Performing HEP?: Yes    Pain  Pain Assessment: 0-10  0-10 (Numeric) Pain Score: 2  Pain Type: Acute pain  Pain Location: Knee  Pain Orientation: Right    Objective   Treatments:    Therapeutic Exercise  Therapeutic Exercise Activity 1: Upright bike: seat 6 - Level 1 x 6 min  Therapeutic Exercise Activity 2: Gastroc stretch: 1 minute - slant board / toes in 1 min  Therapeutic Exercise Activity 3: Plyo hops: 2 x 10  Therapeutic Exercise Activity 4: Dynamics: Marches, Butt kicks, Tin Butler, Side steps/Yband , Monster walks /YTB 20 ft x 2  Therapeutic Exercise Activity 5: Total Gym: Lvl 7 - 2 x min  Therapeutic Exercise Activity 6: Total Gym: L 7 single leg   x 10 - B  Therapeutic Exercise Activity 7: Leg ext: SL: 40#'s - 2 x 10 - B  Therapeutic Exercise Activity 8: 12\" step ups: 2 x 10 - B  Therapeutic Exercise Activity 9: Lunge walk: 20 ft x 2  Therapeutic Exercise Activity 10: Seated H/S stretch: 20 hold x 2     Manual Therapy  Manual Therapy Activity 1: K-tape ( ) at patella w/Cho Pat tape at patellar tendon.  Manual Therapy Activity 2: Posterior capsule stretch: x 1 min  Instruction on self taping. Given tape for home trial.      Assessment:  PT Assessment  Assessment Comment: Continues to " feel benefit from K-tape.  Given tape for home trial. Instructed to working toward achieving TKE - tolerated manual overpressure stretches today.    Plan:  OP PT Plan  Treatment/Interventions: Dry needling, Education/ Instruction, Manual therapy, Neuromuscular re-education, Self care/ home management, Taping techniques, Therapeutic activities, Therapeutic exercises, Vasopneumatic device  PT Plan: Skilled PT  PT Frequency: 2 times per week  Duration: 5 weeks  Onset Date: 06/18/24  Certification Period Start Date: 07/31/24  Certification Period End Date: 10/29/24  Number of Treatments Authorized: eval +3/5  Rehab Potential: Good  Plan of Care Agreement: Patient  Check response to tape and efforts to achieve TKE. Follow up in a few weeks to determine changes with home taping.   Goals:  Active       PT Problem       PT Goal 1       Start:  07/31/24    Expected End:  10/29/24       STG  1) Patient will improve LEFS score by 6 points in order to perform functional activities at home and in the community in 3 weeks.  2) Patient will be able to complete ADLs with pain in knee less than 2/10 in 3 weeks.  3) Pt will improve knee Extension ROM to 0 degrees to be able to complete ADLs with less difficulty in 3 weeks.   4) Patient will be independent with HEP to allow for continued improvement in daily tasks at home and in the community in 3 visits.   LTG  1) Patient will be able to perform proper squatting technique in order to reduce compression on knee and prevent increased pain with daily tasks in 5-6 weeks.  2) Patient will report performing stairs in reciprocal pattern without tightness or pain in 5-6 weeks.   3) Patient will improve LEFS score >/=70/80 points in order to perform functional activities at home and in the community by discharge.          Patient Stated Goal 1       Start:  07/31/24    Expected End:  10/29/24       1) States she would like to kneel and perform sit squat on floor without pain or tightness for  interacting with grandchildren in 5-6 weeks.               Time Calculation  Start Time: 1445  Stop Time: 1530  Time Calculation (min): 45 min   ,

## 2024-08-28 ENCOUNTER — APPOINTMENT (OUTPATIENT)
Dept: PHYSICAL THERAPY | Facility: CLINIC | Age: 64
End: 2024-08-28
Payer: COMMERCIAL

## 2024-09-04 ENCOUNTER — APPOINTMENT (OUTPATIENT)
Dept: PHYSICAL THERAPY | Facility: CLINIC | Age: 64
End: 2024-09-04
Payer: COMMERCIAL

## 2024-11-05 ENCOUNTER — APPOINTMENT (OUTPATIENT)
Dept: PRIMARY CARE | Facility: CLINIC | Age: 64
End: 2024-11-05
Payer: COMMERCIAL

## 2024-11-05 VITALS
SYSTOLIC BLOOD PRESSURE: 104 MMHG | OXYGEN SATURATION: 94 % | DIASTOLIC BLOOD PRESSURE: 72 MMHG | BODY MASS INDEX: 24.83 KG/M2 | HEART RATE: 77 BPM | WEIGHT: 155 LBS

## 2024-11-05 DIAGNOSIS — M25.561 RIGHT KNEE PAIN, UNSPECIFIED CHRONICITY: Primary | ICD-10-CM

## 2024-11-05 PROCEDURE — 1036F TOBACCO NON-USER: CPT | Performed by: FAMILY MEDICINE

## 2024-11-05 PROCEDURE — 1160F RVW MEDS BY RX/DR IN RCRD: CPT | Performed by: FAMILY MEDICINE

## 2024-11-05 PROCEDURE — 1159F MED LIST DOCD IN RCRD: CPT | Performed by: FAMILY MEDICINE

## 2024-11-05 PROCEDURE — 1126F AMNT PAIN NOTED NONE PRSNT: CPT | Performed by: FAMILY MEDICINE

## 2024-11-05 PROCEDURE — 3078F DIAST BP <80 MM HG: CPT | Performed by: FAMILY MEDICINE

## 2024-11-05 PROCEDURE — 99214 OFFICE O/P EST MOD 30 MIN: CPT | Performed by: FAMILY MEDICINE

## 2024-11-05 PROCEDURE — 3074F SYST BP LT 130 MM HG: CPT | Performed by: FAMILY MEDICINE

## 2024-11-05 PROCEDURE — 1123F ACP DISCUSS/DSCN MKR DOCD: CPT | Performed by: FAMILY MEDICINE

## 2024-11-05 ASSESSMENT — PATIENT HEALTH QUESTIONNAIRE - PHQ9
2. FEELING DOWN, DEPRESSED OR HOPELESS: NOT AT ALL
SUM OF ALL RESPONSES TO PHQ9 QUESTIONS 1 AND 2: 0
1. LITTLE INTEREST OR PLEASURE IN DOING THINGS: NOT AT ALL

## 2024-11-05 ASSESSMENT — PAIN SCALES - GENERAL: PAINLEVEL_OUTOF10: 0-NO PAIN

## 2024-11-05 NOTE — PROGRESS NOTES
Subjective   Patient ID: Cathryn Martinez is a 64 y.o. female who presents for Knee Pain (Right knee pain not better after therapy //Declines flu shot ).    MORRIS Kwan is here to follow-up on right knee pain.  She was seen after an acute injury in July.  She was having lateral discomfort of her right knee.  She was sent to physical therapy.  She is now here for follow-up and states that her symptoms are no better.  She felt like her symptoms were better when she continues to rest at.  How now she started trying to run again and she is having the same pain laterally.  It bothers her when she is going up the stairs.  She sometimes hears a click.  It does not lock or give out on her.  Has been no recent swelling.    Review of Systems  She denies redness, warmth or bruising.    Objective   /72 (BP Location: Left arm)   Pulse 77   Wt 70.3 kg (155 lb)   SpO2 94%   BMI 24.83 kg/m²     Physical Exam  She is alert and comfortable.  Her gait is normal.  She is able to do a squat and recover.  Her knees appear symmetrical.  Right knee shows no swelling or bruising.  Anterior posterior drawer signs are negative.  She has no tenderness along the lateral or medial joint line.  No instability with varus and valgus maneuvers of the lower leg.  Has good range of motion.    Assessment/Plan   Assessment & Plan  Right knee pain, unspecified chronicity    Orders:    MR knee right wo IV contrast; Future    Referral to Orthopaedic Surgery; Future    She may take naproxen if she knows she is going to be walking a lot on it.  She should take this twice daily for 5 to 7 days but not chronically.

## 2024-11-11 ENCOUNTER — HOSPITAL ENCOUNTER (OUTPATIENT)
Dept: RADIOLOGY | Facility: CLINIC | Age: 64
Discharge: HOME | End: 2024-11-11
Payer: COMMERCIAL

## 2024-11-11 DIAGNOSIS — M25.561 RIGHT KNEE PAIN, UNSPECIFIED CHRONICITY: ICD-10-CM

## 2024-11-11 PROCEDURE — 73721 MRI JNT OF LWR EXTRE W/O DYE: CPT | Mod: RIGHT SIDE | Performed by: RADIOLOGY

## 2024-11-11 PROCEDURE — 73721 MRI JNT OF LWR EXTRE W/O DYE: CPT | Mod: RT

## 2024-11-11 NOTE — RESULT ENCOUNTER NOTE
As you have seen in the results, you have a tear of your medial meniscus which is the shock absorber in the knee, we talked about. I recommend to make an appointment with the orthopedic doctor for further recommendations

## 2024-12-04 ENCOUNTER — LAB (OUTPATIENT)
Dept: LAB | Facility: LAB | Age: 64
End: 2024-12-04
Payer: COMMERCIAL

## 2024-12-04 DIAGNOSIS — I10 PRIMARY HYPERTENSION: ICD-10-CM

## 2024-12-04 DIAGNOSIS — E78.00 PURE HYPERCHOLESTEROLEMIA: ICD-10-CM

## 2024-12-04 LAB
ALBUMIN SERPL BCP-MCNC: 4.1 G/DL (ref 3.4–5)
ALP SERPL-CCNC: 70 U/L (ref 33–136)
ALT SERPL W P-5'-P-CCNC: 15 U/L (ref 7–45)
ANION GAP SERPL CALCULATED.3IONS-SCNC: 10 MMOL/L (ref 10–20)
APPEARANCE UR: CLEAR
AST SERPL W P-5'-P-CCNC: 18 U/L (ref 9–39)
BILIRUB SERPL-MCNC: 0.8 MG/DL (ref 0–1.2)
BILIRUB UR STRIP.AUTO-MCNC: NEGATIVE MG/DL
BUN SERPL-MCNC: 17 MG/DL (ref 6–23)
CALCIUM SERPL-MCNC: 9.4 MG/DL (ref 8.6–10.3)
CHLORIDE SERPL-SCNC: 103 MMOL/L (ref 98–107)
CHOLEST SERPL-MCNC: 242 MG/DL (ref 0–199)
CHOLEST/HDLC SERPL: 3.3 {RATIO}
CO2 SERPL-SCNC: 32 MMOL/L (ref 21–32)
COLOR UR: COLORLESS
CREAT SERPL-MCNC: 0.96 MG/DL (ref 0.5–1.05)
EGFRCR SERPLBLD CKD-EPI 2021: 66 ML/MIN/1.73M*2
GLUCOSE SERPL-MCNC: 86 MG/DL (ref 74–99)
GLUCOSE UR STRIP.AUTO-MCNC: NORMAL MG/DL
HDLC SERPL-MCNC: 73 MG/DL
KETONES UR STRIP.AUTO-MCNC: NEGATIVE MG/DL
LDLC SERPL CALC-MCNC: 129 MG/DL
LEUKOCYTE ESTERASE UR QL STRIP.AUTO: NEGATIVE
NITRITE UR QL STRIP.AUTO: NEGATIVE
NON HDL CHOLESTEROL: 169 MG/DL (ref 0–149)
PH UR STRIP.AUTO: 5.5 [PH]
POTASSIUM SERPL-SCNC: 4 MMOL/L (ref 3.5–5.3)
PROT SERPL-MCNC: 6.6 G/DL (ref 6.4–8.2)
PROT UR STRIP.AUTO-MCNC: NEGATIVE MG/DL
RBC # UR STRIP.AUTO: NEGATIVE /UL
SODIUM SERPL-SCNC: 141 MMOL/L (ref 136–145)
SP GR UR STRIP.AUTO: 1.01
TRIGL SERPL-MCNC: 201 MG/DL (ref 0–149)
UROBILINOGEN UR STRIP.AUTO-MCNC: NORMAL MG/DL
VLDL: 40 MG/DL (ref 0–40)

## 2024-12-04 PROCEDURE — 80053 COMPREHEN METABOLIC PANEL: CPT

## 2024-12-04 PROCEDURE — 36415 COLL VENOUS BLD VENIPUNCTURE: CPT

## 2024-12-04 PROCEDURE — 80061 LIPID PANEL: CPT

## 2024-12-04 PROCEDURE — 81003 URINALYSIS AUTO W/O SCOPE: CPT

## 2024-12-10 PROBLEM — M25.561 ACUTE PAIN OF RIGHT KNEE: Status: RESOLVED | Noted: 2024-07-31 | Resolved: 2024-12-10

## 2024-12-10 NOTE — PROGRESS NOTES
Subjective   Patient ID: Cathryn Martinez is a 64 y.o. female who presents for Hypertension and Hyperlipidemia.    HPI   # 1. Aniya is here for follow-up of hypertension. Currently on chlorthalidone 12.5 milligrams. She cuts the 25 milligram tablet in half.   # 2. She has hypercholesterolemia.  Her ratio is 3.3.  She is not on medications.  Follows a low-fat diet.  Lab is stable.  # 3.  She had called asking if she could be evaluated for frequent infections at this appointment.  She states that she has had a cold on and off for the past couple months.  It usually last 3 or 4 days and then goes away.  She does not run a fever or chills.  She is able to continue her normal activities.  She generally takes DayQuil.  She states her 's been having similar symptoms and a niece recently had bronchitis.  She denies current URI symptoms.    She is seeing Dr. Marcum for  knee pain after an injury.  She has been going to PT.  She has degenerative OA of the knee and the meniscus.  She had an injection this week.  Her knee feels better since the injection.    Review of Systems  She denies headaches or dizziness.  No chest pain, palpitations, orthopnea or pedal edema.  She denies shortness of breath or dyspnea on exertion.  No cough.    Objective   /80 (BP Location: Left arm)   Pulse 57   Wt 68.9 kg (152 lb)   SpO2 97%   BMI 24.35 kg/m²     Physical Exam  She is alert and comfortable.  Ears TMs are clear.  Nose is noncongested.    Neck is without JVD. No adenopathy.  Lungs are clear to auscultation.  Heart is regular in rhythm and rate.  Normal S1 and S2 without murmurs or gallops.  Extremities are without edema.    Assessment/Plan   Assessment & Plan  Primary hypertension  She should continue the chlorthalidone.  I recommend avoiding adding salt.       Pure hypercholesterolemia  Continue a low-fat diet and her regular exercise.  Hopefully she will be able to exercise more now that her knee is feeling  better.       History of frequent URI  I suggest a trial of over-the-counter loratadine for 6 weeks.  She declined a flu vaccine today.

## 2024-12-13 ENCOUNTER — APPOINTMENT (OUTPATIENT)
Dept: PRIMARY CARE | Facility: CLINIC | Age: 64
End: 2024-12-13
Payer: COMMERCIAL

## 2024-12-13 VITALS
OXYGEN SATURATION: 97 % | BODY MASS INDEX: 24.35 KG/M2 | HEART RATE: 57 BPM | SYSTOLIC BLOOD PRESSURE: 122 MMHG | DIASTOLIC BLOOD PRESSURE: 80 MMHG | WEIGHT: 152 LBS

## 2024-12-13 DIAGNOSIS — E78.00 PURE HYPERCHOLESTEROLEMIA: ICD-10-CM

## 2024-12-13 DIAGNOSIS — Z87.09 HISTORY OF FREQUENT URI: ICD-10-CM

## 2024-12-13 DIAGNOSIS — I10 PRIMARY HYPERTENSION: Primary | ICD-10-CM

## 2024-12-13 PROCEDURE — 3079F DIAST BP 80-89 MM HG: CPT | Performed by: FAMILY MEDICINE

## 2024-12-13 PROCEDURE — 1160F RVW MEDS BY RX/DR IN RCRD: CPT | Performed by: FAMILY MEDICINE

## 2024-12-13 PROCEDURE — 1123F ACP DISCUSS/DSCN MKR DOCD: CPT | Performed by: FAMILY MEDICINE

## 2024-12-13 PROCEDURE — 1159F MED LIST DOCD IN RCRD: CPT | Performed by: FAMILY MEDICINE

## 2024-12-13 PROCEDURE — 99213 OFFICE O/P EST LOW 20 MIN: CPT | Performed by: FAMILY MEDICINE

## 2024-12-13 PROCEDURE — 3074F SYST BP LT 130 MM HG: CPT | Performed by: FAMILY MEDICINE

## 2024-12-13 PROCEDURE — 1126F AMNT PAIN NOTED NONE PRSNT: CPT | Performed by: FAMILY MEDICINE

## 2024-12-13 PROCEDURE — 1036F TOBACCO NON-USER: CPT | Performed by: FAMILY MEDICINE

## 2024-12-13 ASSESSMENT — PAIN SCALES - GENERAL: PAINLEVEL_OUTOF10: 0-NO PAIN

## 2024-12-13 NOTE — ASSESSMENT & PLAN NOTE
Continue a low-fat diet and her regular exercise.  Hopefully she will be able to exercise more now that her knee is feeling better.

## 2025-01-02 ENCOUNTER — TELEPHONE (OUTPATIENT)
Dept: PRIMARY CARE | Facility: CLINIC | Age: 65
End: 2025-01-02
Payer: COMMERCIAL

## 2025-01-02 NOTE — TELEPHONE ENCOUNTER
Patient tested positive for COVID on 12-30-24 and is scheduled for a procedure on her Knee on 01-06-25 with Dr. Marcum. Should Patient have the procedure? Dr Marcum office was closed for the holiday so Patient didn't contact his office, Patient also needs a surgery clearance appointment is it OK to schedule?    Patient can be reached at [phone]

## 2025-01-02 NOTE — TELEPHONE ENCOUNTER
Spoke with patient and informed. She will call Dr. Marcum office to reschedule and then call us back to schedule pre-surgical clearance within 30 days of surgery date.

## 2025-01-13 ENCOUNTER — DOCUMENTATION (OUTPATIENT)
Dept: PHYSICAL THERAPY | Facility: CLINIC | Age: 65
End: 2025-01-13
Payer: COMMERCIAL

## 2025-01-13 NOTE — PROGRESS NOTES
Discharge Summary    Name: Cathryn Martinez  MRN: [id number]  : 1960  Date: 25    Treatment Diagnosis:   Problem List Items Addressed This Visit    None      Discharge Summary: PT    Discharge Information: Date of Discharge 2025    Therapy Summary: Patient only attended the initial evaluation and 4 follow ups. Due to lack of follow ups, compliance and goal achievement unassessed at this time.     Rehab Discharge Reason: Failed to schedule and/or keep follow-up appointment(s)

## 2025-02-07 NOTE — PROGRESS NOTES
Subjective   Patient ID: Cathryn Martinez is a 65 y.o. female who presents for pre surgical clearance.    MORRIS Lockett is here for preop clearance for arthroscopic knee surgery.  She has been seeing Dr. Marcum.  She is having an arthroscopic partial lateral meniscectomy on February 17.  She has hypertension and is on chlorthalidone 12.5 mg.    Review of Systems  No recent illness.  She denies fever or chills.  No headache or dizziness.  She denies chest pain, palpitations, orthopnea or pedal edema.  She denies shortness of breath, dyspnea on exertion or cough.  She denies abdominal pain, indigestion or change in bowel habits.  No dysuria or hematuria.  Denies skin rash.    Objective   /64 (BP Location: Left arm)   Pulse 82   Wt 71.2 kg (157 lb)   SpO2 93%   BMI 25.15 kg/m²     Physical Exam  She is alert and comfortable.  Ears TMs are clear.  Nose is noncongested.  Throat is noninjected and without exudate.  Supple without adenopathy.  No carotid bruits.  Lungs are clear to auscultation.  Heart is regular in rhythm and rate.  Normal S1 and S2 without murmurs or gallops.  Abdomen is soft and nontender.  No rebound guarding or masses.  No organomegaly.  Skin is intact with normal turgor.  Mucous membranes are moist.  Extremities are without edema.  Neuro is intact and nonfocal.      Assessment/Plan   Assessment & Plan  Pre-operative clearance    Orders:    ECG 12 lead (Clinic Performed)    CBC and Auto Differential; Future    Will send clearance letter over once her lab work is available.

## 2025-02-11 ENCOUNTER — APPOINTMENT (OUTPATIENT)
Dept: PRIMARY CARE | Facility: CLINIC | Age: 65
End: 2025-02-11
Payer: COMMERCIAL

## 2025-02-11 VITALS
BODY MASS INDEX: 25.15 KG/M2 | HEART RATE: 82 BPM | OXYGEN SATURATION: 93 % | DIASTOLIC BLOOD PRESSURE: 64 MMHG | WEIGHT: 157 LBS | SYSTOLIC BLOOD PRESSURE: 104 MMHG

## 2025-02-11 DIAGNOSIS — Z01.818 PRE-OPERATIVE CLEARANCE: Primary | ICD-10-CM

## 2025-02-11 LAB
BASOPHILS # BLD AUTO: 88 CELLS/UL (ref 0–200)
BASOPHILS NFR BLD AUTO: 1.1 %
EOSINOPHIL # BLD AUTO: 104 CELLS/UL (ref 15–500)
EOSINOPHIL NFR BLD AUTO: 1.3 %
ERYTHROCYTE [DISTWIDTH] IN BLOOD BY AUTOMATED COUNT: 13 % (ref 11–15)
HCT VFR BLD AUTO: 45 % (ref 35–45)
HGB BLD-MCNC: 14.4 G/DL (ref 11.7–15.5)
LYMPHOCYTES # BLD AUTO: 2160 CELLS/UL (ref 850–3900)
LYMPHOCYTES NFR BLD AUTO: 27 %
MCH RBC QN AUTO: 28.9 PG (ref 27–33)
MCHC RBC AUTO-ENTMCNC: 32 G/DL (ref 32–36)
MCV RBC AUTO: 90.4 FL (ref 80–100)
MONOCYTES # BLD AUTO: 696 CELLS/UL (ref 200–950)
MONOCYTES NFR BLD AUTO: 8.7 %
NEUTROPHILS # BLD AUTO: 4952 CELLS/UL (ref 1500–7800)
NEUTROPHILS NFR BLD AUTO: 61.9 %
PLATELET # BLD AUTO: 283 THOUSAND/UL (ref 140–400)
PMV BLD REES-ECKER: 10.3 FL (ref 7.5–12.5)
RBC # BLD AUTO: 4.98 MILLION/UL (ref 3.8–5.1)
WBC # BLD AUTO: 8 THOUSAND/UL (ref 3.8–10.8)

## 2025-02-11 PROCEDURE — 93000 ELECTROCARDIOGRAM COMPLETE: CPT | Performed by: FAMILY MEDICINE

## 2025-02-11 PROCEDURE — 1159F MED LIST DOCD IN RCRD: CPT | Performed by: FAMILY MEDICINE

## 2025-02-11 PROCEDURE — 1160F RVW MEDS BY RX/DR IN RCRD: CPT | Performed by: FAMILY MEDICINE

## 2025-02-11 PROCEDURE — 1126F AMNT PAIN NOTED NONE PRSNT: CPT | Performed by: FAMILY MEDICINE

## 2025-02-11 PROCEDURE — 1123F ACP DISCUSS/DSCN MKR DOCD: CPT | Performed by: FAMILY MEDICINE

## 2025-02-11 PROCEDURE — 3074F SYST BP LT 130 MM HG: CPT | Performed by: FAMILY MEDICINE

## 2025-02-11 PROCEDURE — 99214 OFFICE O/P EST MOD 30 MIN: CPT | Performed by: FAMILY MEDICINE

## 2025-02-11 PROCEDURE — 3078F DIAST BP <80 MM HG: CPT | Performed by: FAMILY MEDICINE

## 2025-02-11 PROCEDURE — 1036F TOBACCO NON-USER: CPT | Performed by: FAMILY MEDICINE

## 2025-02-11 PROCEDURE — 1158F ADVNC CARE PLAN TLK DOCD: CPT | Performed by: FAMILY MEDICINE

## 2025-02-11 ASSESSMENT — PATIENT HEALTH QUESTIONNAIRE - PHQ9
SUM OF ALL RESPONSES TO PHQ9 QUESTIONS 1 AND 2: 0
2. FEELING DOWN, DEPRESSED OR HOPELESS: NOT AT ALL
1. LITTLE INTEREST OR PLEASURE IN DOING THINGS: NOT AT ALL

## 2025-02-11 ASSESSMENT — PAIN SCALES - GENERAL: PAINLEVEL_OUTOF10: 0-NO PAIN

## 2025-02-11 NOTE — PATIENT INSTRUCTIONS
Good luck with your upcoming surgery!  You may get your blood work completed at your convenience over the next day or 2.  This does not need to be fasting.

## 2025-04-01 ENCOUNTER — TELEPHONE (OUTPATIENT)
Dept: PRIMARY CARE | Facility: CLINIC | Age: 65
End: 2025-04-01
Payer: COMMERCIAL

## 2025-04-01 DIAGNOSIS — I10 PRIMARY HYPERTENSION: ICD-10-CM

## 2025-04-01 DIAGNOSIS — E78.00 PURE HYPERCHOLESTEROLEMIA: ICD-10-CM

## 2025-04-28 ENCOUNTER — PATIENT MESSAGE (OUTPATIENT)
Dept: PRIMARY CARE | Facility: CLINIC | Age: 65
End: 2025-04-28
Payer: COMMERCIAL

## 2025-04-28 DIAGNOSIS — I10 PRIMARY HYPERTENSION: ICD-10-CM

## 2025-04-29 RX ORDER — CHLORTHALIDONE 25 MG/1
12.5 TABLET ORAL DAILY
Qty: 45 TABLET | Refills: 0 | Status: SHIPPED | OUTPATIENT
Start: 2025-04-29 | End: 2026-04-29

## 2025-05-11 DIAGNOSIS — E78.00 PURE HYPERCHOLESTEROLEMIA: ICD-10-CM

## 2025-05-11 DIAGNOSIS — I10 PRIMARY HYPERTENSION: ICD-10-CM

## 2025-05-20 DIAGNOSIS — Z01.84 IMMUNITY STATUS TESTING: Primary | ICD-10-CM

## 2025-05-27 LAB
ALBUMIN SERPL-MCNC: 4.2 G/DL (ref 3.6–5.1)
ALP SERPL-CCNC: 70 U/L (ref 37–153)
ALT SERPL-CCNC: 12 U/L (ref 6–29)
ANION GAP SERPL CALCULATED.4IONS-SCNC: 9 MMOL/L (CALC) (ref 7–17)
AST SERPL-CCNC: 16 U/L (ref 10–35)
BASOPHILS # BLD AUTO: 78 CELLS/UL (ref 0–200)
BASOPHILS NFR BLD AUTO: 1.3 %
BILIRUB SERPL-MCNC: 0.6 MG/DL (ref 0.2–1.2)
BUN SERPL-MCNC: 16 MG/DL (ref 7–25)
CALCIUM SERPL-MCNC: 9.3 MG/DL (ref 8.6–10.4)
CHLORIDE SERPL-SCNC: 103 MMOL/L (ref 98–110)
CHOLEST SERPL-MCNC: 231 MG/DL
CHOLEST/HDLC SERPL: 2.8 (CALC)
CO2 SERPL-SCNC: 30 MMOL/L (ref 20–32)
CREAT SERPL-MCNC: 0.91 MG/DL (ref 0.5–1.05)
EGFRCR SERPLBLD CKD-EPI 2021: 70 ML/MIN/1.73M2
EOSINOPHIL # BLD AUTO: 90 CELLS/UL (ref 15–500)
EOSINOPHIL NFR BLD AUTO: 1.5 %
ERYTHROCYTE [DISTWIDTH] IN BLOOD BY AUTOMATED COUNT: 12.2 % (ref 11–15)
GLUCOSE SERPL-MCNC: 87 MG/DL (ref 65–99)
HCT VFR BLD AUTO: 44.8 % (ref 35–45)
HDLC SERPL-MCNC: 83 MG/DL
HGB BLD-MCNC: 14.4 G/DL (ref 11.7–15.5)
LDLC SERPL CALC-MCNC: 121 MG/DL (CALC)
LYMPHOCYTES # BLD AUTO: 2130 CELLS/UL (ref 850–3900)
LYMPHOCYTES NFR BLD AUTO: 35.5 %
MCH RBC QN AUTO: 29 PG (ref 27–33)
MCHC RBC AUTO-ENTMCNC: 32.1 G/DL (ref 32–36)
MCV RBC AUTO: 90.1 FL (ref 80–100)
MONOCYTES # BLD AUTO: 432 CELLS/UL (ref 200–950)
MONOCYTES NFR BLD AUTO: 7.2 %
NEUTROPHILS # BLD AUTO: 3270 CELLS/UL (ref 1500–7800)
NEUTROPHILS NFR BLD AUTO: 54.5 %
NONHDLC SERPL-MCNC: 148 MG/DL (CALC)
PLATELET # BLD AUTO: 297 THOUSAND/UL (ref 140–400)
PMV BLD REES-ECKER: 10.2 FL (ref 7.5–12.5)
POTASSIUM SERPL-SCNC: 3.8 MMOL/L (ref 3.5–5.3)
PROT SERPL-MCNC: 6.6 G/DL (ref 6.1–8.1)
RBC # BLD AUTO: 4.97 MILLION/UL (ref 3.8–5.1)
SODIUM SERPL-SCNC: 142 MMOL/L (ref 135–146)
TRIGL SERPL-MCNC: 153 MG/DL
WBC # BLD AUTO: 6 THOUSAND/UL (ref 3.8–10.8)

## 2025-05-28 LAB — MEV IGG SER IA-ACNC: 91.2 AU/ML

## 2025-06-10 ASSESSMENT — PROMIS GLOBAL HEALTH SCALE
RATE_QUALITY_OF_LIFE: EXCELLENT
RATE_AVERAGE_PAIN: 2
CARRYOUT_SOCIAL_ACTIVITIES: EXCELLENT
CARRYOUT_PHYSICAL_ACTIVITIES: COMPLETELY
RATE_PHYSICAL_HEALTH: EXCELLENT
RATE_AVERAGE_FATIGUE: MILD
RATE_SOCIAL_SATISFACTION: VERY GOOD
RATE_MENTAL_HEALTH: EXCELLENT
EMOTIONAL_PROBLEMS: NEVER
RATE_GENERAL_HEALTH: VERY GOOD

## 2025-06-11 ENCOUNTER — APPOINTMENT (OUTPATIENT)
Dept: PRIMARY CARE | Facility: CLINIC | Age: 65
End: 2025-06-11
Payer: COMMERCIAL

## 2025-06-11 ENCOUNTER — TELEPHONE (OUTPATIENT)
Dept: PRIMARY CARE | Facility: CLINIC | Age: 65
End: 2025-06-11

## 2025-06-11 VITALS
HEIGHT: 66 IN | DIASTOLIC BLOOD PRESSURE: 70 MMHG | WEIGHT: 155.6 LBS | SYSTOLIC BLOOD PRESSURE: 100 MMHG | BODY MASS INDEX: 25.01 KG/M2 | OXYGEN SATURATION: 97 % | TEMPERATURE: 96.7 F | HEART RATE: 75 BPM

## 2025-06-11 DIAGNOSIS — I10 PRIMARY HYPERTENSION: ICD-10-CM

## 2025-06-11 DIAGNOSIS — E78.00 PURE HYPERCHOLESTEROLEMIA: ICD-10-CM

## 2025-06-11 DIAGNOSIS — E78.2 MIXED HYPERLIPIDEMIA: ICD-10-CM

## 2025-06-11 DIAGNOSIS — Z12.11 SCREENING FOR COLON CANCER: ICD-10-CM

## 2025-06-11 DIAGNOSIS — Z00.00 WELL ADULT EXAM: Primary | ICD-10-CM

## 2025-06-11 DIAGNOSIS — Z12.31 SCREENING MAMMOGRAM FOR BREAST CANCER: ICD-10-CM

## 2025-06-11 DIAGNOSIS — H61.22 IMPACTED CERUMEN OF LEFT EAR: ICD-10-CM

## 2025-06-11 LAB
POC APPEARANCE, URINE: CLEAR
POC BILIRUBIN, URINE: NEGATIVE
POC BLOOD, URINE: NEGATIVE
POC COLOR, URINE: YELLOW
POC GLUCOSE, URINE: NEGATIVE MG/DL
POC KETONES, URINE: NEGATIVE MG/DL
POC LEUKOCYTES, URINE: NEGATIVE
POC NITRITE,URINE: NEGATIVE
POC PH, URINE: 5.5 PH
POC PROTEIN, URINE: NEGATIVE MG/DL
POC SPECIFIC GRAVITY, URINE: 1.02
POC UROBILINOGEN, URINE: 0.2 EU/DL

## 2025-06-11 PROCEDURE — 3008F BODY MASS INDEX DOCD: CPT

## 2025-06-11 PROCEDURE — 81003 URINALYSIS AUTO W/O SCOPE: CPT

## 2025-06-11 PROCEDURE — 1036F TOBACCO NON-USER: CPT

## 2025-06-11 PROCEDURE — 1159F MED LIST DOCD IN RCRD: CPT

## 2025-06-11 PROCEDURE — 99397 PER PM REEVAL EST PAT 65+ YR: CPT

## 2025-06-11 PROCEDURE — 3078F DIAST BP <80 MM HG: CPT

## 2025-06-11 PROCEDURE — 3074F SYST BP LT 130 MM HG: CPT

## 2025-06-11 PROCEDURE — 99214 OFFICE O/P EST MOD 30 MIN: CPT

## 2025-06-11 PROCEDURE — 1126F AMNT PAIN NOTED NONE PRSNT: CPT

## 2025-06-11 ASSESSMENT — ENCOUNTER SYMPTOMS
ARTHRALGIAS: 0
LOSS OF SENSATION IN FEET: 0
HEADACHES: 0
OCCASIONAL FEELINGS OF UNSTEADINESS: 0
BLOOD IN STOOL: 0
NUMBNESS: 0
DYSURIA: 0
NERVOUS/ANXIOUS: 0
UNEXPECTED WEIGHT CHANGE: 0
DYSPHORIC MOOD: 0
COUGH: 0
MYALGIAS: 0
SHORTNESS OF BREATH: 0
TROUBLE SWALLOWING: 0
CHILLS: 0
SORE THROAT: 0
HEMATURIA: 0
ABDOMINAL PAIN: 0
DEPRESSION: 0
WEAKNESS: 0
VOMITING: 0
NAUSEA: 0
FEVER: 0
RHINORRHEA: 0

## 2025-06-11 ASSESSMENT — PAIN SCALES - GENERAL: PAINLEVEL_OUTOF10: 0-NO PAIN

## 2025-06-11 NOTE — PROGRESS NOTES
Subjective   Patient ID: Cathryn Martinez is a 65 y.o. female who presents for Annual Exam (Pt has concerns for left ear that feels blocked (no pain) and would like to discuss motion sickness medication options for a cruise she'll be going on in 7/2025.).    HPI   Cathryn Martinez is a previous patient of Dr. Blunt here to establish care with me and seen for her comprehensive physical exam. PMH, SH, FH, medications were reviewed and updated.     CHRONIC ISSUES:   HTN: On chlorthalidone 12.5 mg daily. No medication side effects. Does not check home BP. Denies chest pain, heart palpitations, SOB, dizziness, headaches, changes of vision, syncope, leg swelling.     HLD: On no medications.  Denies chest pain, SOB, nausea, vomiting, myalgias.   Lab Results   Component Value Date    TRIG 153 (H) 05/27/2025    CHOL 231 (H) 05/27/2025    LDLCALC 121 (H) 05/27/2025    HDL 83 05/27/2025     The 10-year ASCVD risk score (William LEDEZMA, et al., 2019) is: 3.6%    Values used to calculate the score:      Age: 65 years      Sex: Female      Is Non- : No      Diabetic: No      Tobacco smoker: No      Systolic Blood Pressure: 100 mmHg      Is BP treated: No      HDL Cholesterol: 83 mg/dL      Total Cholesterol: 231 mg/dL    IMMUNIZATIONS:   Immunization History   Administered Date(s) Administered    COVID-19, mRNA, LNP-S, PF, 30 mcg/0.3 mL dose 03/16/2021    Flu vaccine (IIV4), preservative free *Check age/dose* 10/16/2021, 12/07/2022, 01/17/2024    Influenza, injectable, quadrivalent 11/04/2019, 11/16/2020    Pfizer Purple Cap SARS-CoV-2 03/15/2021, 04/15/2021    Pneumococcal conjugate vaccine, 20-valent (PREVNAR 20) 06/07/2024    RESPIRATORY SYNCYTIAL VIRUS (RSV), ELIGIBLE PREGNANT PTS, 0.5 ML (ABRYSVO) 01/08/2024    Tdap vaccine, age 7 year and older (BOOSTRIX, ADACEL) 03/12/2014, 03/21/2024    Zoster vaccine, recombinant, adult (SHINGRIX) 10/01/2020, 10/28/2020, 12/01/2020, 12/29/2020        LUNG CANCER  "SCREENING (50-77 y.o. with 20+ pack year hx & current smoker or quit <15yrs ago)  History - Tobacco Use - Hide Smokeless[1]      COLORECTAL SCREENING (From 45 or 10yrs younger than family diagnosis)  Last colonoscopy -08/2020  Next colonoscopy due -08/2025  Relevant FHx - None    GYN  LMP -unknown, denies vaginal bleeding  Next Pap due -no longer indicated    MAMMOGRAM SCREENING (From age 39)   Last mammogram -07/08/2024  Next mammogram due -07/25    DEXA: 7/23, normal  UTD on eye exam, wears contacts    Review of Systems   Constitutional:  Negative for chills, fever and unexpected weight change.   HENT:  Negative for congestion, ear pain, hearing loss, rhinorrhea, sore throat and trouble swallowing.    Eyes:  Negative for visual disturbance.   Respiratory:  Negative for cough and shortness of breath.    Cardiovascular:  Negative for chest pain and leg swelling.   Gastrointestinal:  Negative for abdominal pain, blood in stool, nausea and vomiting.   Genitourinary:  Negative for dysuria and hematuria.   Musculoskeletal:  Negative for arthralgias and myalgias.   Skin:  Negative for rash.   Neurological:  Negative for weakness, numbness and headaches.   Psychiatric/Behavioral:  Negative for dysphoric mood. The patient is not nervous/anxious.      Objective   /70   Pulse 75   Temp 35.9 °C (96.7 °F) (Temporal)   Ht 1.676 m (5' 6\")   Wt 70.6 kg (155 lb 9.6 oz)   SpO2 97%   BMI 25.11 kg/m²     Physical Exam  Vitals and nursing note reviewed. Chaperone present: Declined chaperone.   Constitutional:       General: She is not in acute distress.  HENT:      Right Ear: Tympanic membrane and ear canal normal.      Left Ear: Ear canal normal. There is impacted cerumen.      Nose: Nose normal.      Mouth/Throat:      Mouth: Mucous membranes are moist.   Eyes:      Extraocular Movements: Extraocular movements intact.      Conjunctiva/sclera: Conjunctivae normal.      Pupils: Pupils are equal, round, and reactive to " light.   Neck:      Vascular: No carotid bruit.   Cardiovascular:      Rate and Rhythm: Normal rate and regular rhythm.      Pulses:           Dorsalis pedis pulses are 2+ on the right side and 2+ on the left side.   Pulmonary:      Effort: Pulmonary effort is normal.      Breath sounds: Normal breath sounds.   Chest:   Breasts:     Right: Normal.      Left: Normal.   Abdominal:      General: Abdomen is flat. Bowel sounds are normal.      Palpations: Abdomen is soft.      Tenderness: There is no abdominal tenderness.   Musculoskeletal:         General: Normal range of motion.      Cervical back: Neck supple.      Right lower leg: No edema.      Left lower leg: No edema.   Lymphadenopathy:      Cervical: No cervical adenopathy.      Upper Body:      Right upper body: No supraclavicular or axillary adenopathy.      Left upper body: No supraclavicular or axillary adenopathy.   Skin:     General: Skin is warm.   Neurological:      General: No focal deficit present.      Mental Status: She is alert.   Psychiatric:         Mood and Affect: Mood normal.         Assessment/Plan   Assessment & Plan  Well adult exam  Preventative measures discussed. Labs reviewed with patient.  Immunizations discussed.     Orders:    POCT UA Automated manually resulted    Mixed hyperlipidemia  Chronic.  Keep off medication at this time.  May try OTC red yeast rice extract.  Decrease fast food, fried food, processed foods and large amounts of red meat. Increase lean protein, vegetables and exercise. Recheck in 6 months.          Primary hypertension  Chronic.  Chlorthalidone 12.5 mg daily.  DASH diet and 30 minutes of exercise 5x/week. Check home BP and keep log. Recheck in 6 months.     Orders:    Albumin-Creatinine Ratio, Urine Random    Screening mammogram for breast cancer    Orders:    BI mammo bilateral screening tomosynthesis; Future    Screening for colon cancer    Orders:    Referral to Gastroenterology; Future    Impacted cerumen of  left ear  Chronic.  Unable to remove with curette.  Patient instructed to use OTC Debrox drops to soften wax.  She should schedule a follow-up appointment for cerumen removal.                     [1]   Social History  Tobacco Use   Smoking Status Never   Smokeless Tobacco Never

## 2025-06-11 NOTE — ASSESSMENT & PLAN NOTE
Chronic.  Keep off medication at this time.  May try OTC red yeast rice extract.  Decrease fast food, fried food, processed foods and large amounts of red meat. Increase lean protein, vegetables and exercise. Recheck in 6 months.

## 2025-06-11 NOTE — ASSESSMENT & PLAN NOTE
Chronic.  Chlorthalidone 12.5 mg daily.  DASH diet and 30 minutes of exercise 5x/week. Check home BP and keep log. Recheck in 6 months.     Orders:    Albumin-Creatinine Ratio, Urine Random

## 2025-06-12 LAB
ALBUMIN/CREAT UR: NORMAL MG/G CREAT
CREAT UR-MCNC: 34 MG/DL (ref 20–275)
MICROALBUMIN UR-MCNC: <0.2 MG/DL

## 2025-06-18 ENCOUNTER — APPOINTMENT (OUTPATIENT)
Dept: PRIMARY CARE | Facility: CLINIC | Age: 65
End: 2025-06-18
Payer: COMMERCIAL

## 2025-06-19 ENCOUNTER — OFFICE VISIT (OUTPATIENT)
Dept: PRIMARY CARE | Facility: CLINIC | Age: 65
End: 2025-06-19
Payer: COMMERCIAL

## 2025-06-19 VITALS
BODY MASS INDEX: 25.7 KG/M2 | DIASTOLIC BLOOD PRESSURE: 80 MMHG | OXYGEN SATURATION: 99 % | HEART RATE: 62 BPM | TEMPERATURE: 97.2 F | WEIGHT: 159.2 LBS | SYSTOLIC BLOOD PRESSURE: 110 MMHG

## 2025-06-19 DIAGNOSIS — H61.22 IMPACTED CERUMEN OF LEFT EAR: Primary | ICD-10-CM

## 2025-06-19 ASSESSMENT — PAIN SCALES - GENERAL: PAINLEVEL_OUTOF10: 0-NO PAIN

## 2025-06-19 ASSESSMENT — PATIENT HEALTH QUESTIONNAIRE - PHQ9
SUM OF ALL RESPONSES TO PHQ9 QUESTIONS 1 AND 2: 0
1. LITTLE INTEREST OR PLEASURE IN DOING THINGS: NOT AT ALL
2. FEELING DOWN, DEPRESSED OR HOPELESS: NOT AT ALL

## 2025-06-19 NOTE — PROGRESS NOTES
Patient ID: Cathryn Martinez is a 65 y.o. female.    Ear Cerumen Removal    Date/Time: 6/19/2025 3:24 PM    Performed by: Sydni Jimenez PA-C  Authorized by: Sydni Jimenez PA-C    Consent:     Consent obtained:  Verbal    Consent given by:  Patient    Risks, benefits, and alternatives were discussed: yes      Risks discussed:  Bleeding, dizziness, incomplete removal, infection, TM perforation and pain    Alternatives discussed:  Delayed treatment and referral  Procedure details:     Location:  L ear    Procedure type: irrigation      Procedure outcomes: cerumen removed    Post-procedure details:     Inspection:  Ear canal clear, no bleeding and TM intact    Hearing quality:  Improved    Procedure completion:  Tolerated well, no immediate complications  Comments:      Patient tolerated procedure well.  Avoid use of Q-tips.  May use Debrox drops/mineral oil periodically to prevent buildup of cerumen.  Follow-up as needed.

## 2025-07-16 ENCOUNTER — HOSPITAL ENCOUNTER (OUTPATIENT)
Dept: RADIOLOGY | Facility: CLINIC | Age: 65
Discharge: HOME | End: 2025-07-16
Payer: COMMERCIAL

## 2025-07-16 VITALS — WEIGHT: 152 LBS | BODY MASS INDEX: 24.53 KG/M2

## 2025-07-16 DIAGNOSIS — Z12.31 SCREENING MAMMOGRAM FOR BREAST CANCER: ICD-10-CM

## 2025-07-16 PROCEDURE — 77063 BREAST TOMOSYNTHESIS BI: CPT

## 2025-07-16 PROCEDURE — 77063 BREAST TOMOSYNTHESIS BI: CPT | Performed by: RADIOLOGY

## 2025-07-16 PROCEDURE — 77067 SCR MAMMO BI INCL CAD: CPT | Performed by: RADIOLOGY

## 2025-08-19 ENCOUNTER — TELEPHONE (OUTPATIENT)
Dept: PRIMARY CARE | Facility: CLINIC | Age: 65
End: 2025-08-19
Payer: COMMERCIAL

## 2025-08-19 DIAGNOSIS — I10 PRIMARY HYPERTENSION: ICD-10-CM

## 2025-12-11 ENCOUNTER — APPOINTMENT (OUTPATIENT)
Dept: PRIMARY CARE | Facility: CLINIC | Age: 65
End: 2025-12-11
Payer: COMMERCIAL